# Patient Record
Sex: FEMALE | Race: WHITE | NOT HISPANIC OR LATINO | Employment: FULL TIME | URBAN - METROPOLITAN AREA
[De-identification: names, ages, dates, MRNs, and addresses within clinical notes are randomized per-mention and may not be internally consistent; named-entity substitution may affect disease eponyms.]

---

## 2023-06-01 ENCOUNTER — PATIENT MESSAGE (OUTPATIENT)
Dept: PRIMARY CARE | Facility: CLINIC | Age: 53
End: 2023-06-01
Payer: COMMERCIAL

## 2023-06-01 DIAGNOSIS — E03.9 HYPOTHYROIDISM, UNSPECIFIED TYPE: ICD-10-CM

## 2023-06-06 PROBLEM — E03.9 HYPOTHYROIDISM: Status: ACTIVE | Noted: 2023-06-06

## 2023-06-06 RX ORDER — LEVOTHYROXINE SODIUM 88 UG/1
88 TABLET ORAL DAILY
Qty: 90 TABLET | Refills: 0 | Status: SHIPPED | OUTPATIENT
Start: 2023-06-06 | End: 2023-09-26 | Stop reason: SDUPTHER

## 2023-06-06 RX ORDER — LEVOTHYROXINE SODIUM 88 UG/1
88 TABLET ORAL DAILY
COMMUNITY
Start: 2022-07-18 | End: 2023-06-06 | Stop reason: SDUPTHER

## 2023-06-22 PROBLEM — R73.01 IFG (IMPAIRED FASTING GLUCOSE): Status: ACTIVE | Noted: 2023-06-22

## 2023-06-22 PROBLEM — K21.9 GERD (GASTROESOPHAGEAL REFLUX DISEASE): Status: ACTIVE | Noted: 2023-06-22

## 2023-06-22 PROBLEM — H93.13 TINNITUS OF BOTH EARS: Status: ACTIVE | Noted: 2023-06-22

## 2023-06-22 PROBLEM — R93.1 ABNORMAL ECHOCARDIOGRAM: Status: ACTIVE | Noted: 2023-06-22

## 2023-06-22 PROBLEM — M25.512 LEFT SHOULDER PAIN: Status: ACTIVE | Noted: 2023-06-22

## 2023-06-22 PROBLEM — Z86.16 HISTORY OF COVID-19: Status: ACTIVE | Noted: 2023-06-22

## 2023-06-22 PROBLEM — R73.02 IMPAIRED GLUCOSE TOLERANCE: Status: ACTIVE | Noted: 2023-06-22

## 2023-06-22 PROBLEM — R35.0 URINARY FREQUENCY: Status: ACTIVE | Noted: 2023-06-22

## 2023-06-22 PROBLEM — N92.0 MENORRHAGIA WITH REGULAR CYCLE: Status: ACTIVE | Noted: 2023-06-22

## 2023-06-22 PROBLEM — E78.5 HYPERLIPIDEMIA: Status: ACTIVE | Noted: 2023-06-22

## 2023-06-22 PROBLEM — R94.31 RIGHT AXIS DEVIATION: Status: ACTIVE | Noted: 2023-06-22

## 2023-06-22 PROBLEM — I10 HYPERTENSION: Status: ACTIVE | Noted: 2023-06-22

## 2023-06-22 PROBLEM — H10.9 BACTERIAL CONJUNCTIVITIS: Status: ACTIVE | Noted: 2023-06-22

## 2023-06-22 PROBLEM — R51.9 UNILATERAL HEADACHE: Status: ACTIVE | Noted: 2023-06-22

## 2023-06-22 PROBLEM — E55.9 VITAMIN D INSUFFICIENCY: Status: ACTIVE | Noted: 2023-06-22

## 2023-06-22 PROBLEM — D50.9 IRON DEFICIENCY ANEMIA: Status: ACTIVE | Noted: 2023-06-22

## 2023-06-22 RX ORDER — METOPROLOL SUCCINATE 50 MG/1
1 TABLET, EXTENDED RELEASE ORAL DAILY
COMMUNITY
Start: 2013-09-25 | End: 2023-07-25

## 2023-06-22 RX ORDER — FERROUS SULFATE 325(65) MG
1 TABLET ORAL 2 TIMES DAILY
COMMUNITY
Start: 2017-09-06 | End: 2024-01-24 | Stop reason: ALTCHOICE

## 2023-06-22 RX ORDER — MULTIVITAMIN
1 TABLET ORAL DAILY
COMMUNITY
Start: 2022-07-15

## 2023-06-22 RX ORDER — METFORMIN HYDROCHLORIDE 500 MG/1
2 TABLET, EXTENDED RELEASE ORAL
COMMUNITY
Start: 2015-10-01 | End: 2023-07-25

## 2023-06-22 RX ORDER — CHOLECALCIFEROL (VITAMIN D3) 25 MCG
1 TABLET ORAL DAILY
COMMUNITY
Start: 2022-07-15

## 2023-06-22 RX ORDER — TRIAMCINOLONE ACETONIDE 1 MG/G
CREAM TOPICAL
COMMUNITY
Start: 2021-05-03

## 2023-07-24 ENCOUNTER — OFFICE VISIT (OUTPATIENT)
Dept: PRIMARY CARE | Facility: CLINIC | Age: 53
End: 2023-07-24
Payer: COMMERCIAL

## 2023-07-24 VITALS
BODY MASS INDEX: 25.37 KG/M2 | DIASTOLIC BLOOD PRESSURE: 68 MMHG | TEMPERATURE: 98.1 F | WEIGHT: 162 LBS | SYSTOLIC BLOOD PRESSURE: 116 MMHG

## 2023-07-24 DIAGNOSIS — M25.562 PAIN IN BOTH KNEES, UNSPECIFIED CHRONICITY: ICD-10-CM

## 2023-07-24 DIAGNOSIS — E78.5 HYPERLIPIDEMIA, UNSPECIFIED HYPERLIPIDEMIA TYPE: ICD-10-CM

## 2023-07-24 DIAGNOSIS — I10 HYPERTENSION, UNSPECIFIED TYPE: Primary | ICD-10-CM

## 2023-07-24 DIAGNOSIS — E03.9 HYPOTHYROIDISM, UNSPECIFIED TYPE: ICD-10-CM

## 2023-07-24 DIAGNOSIS — M25.551 BILATERAL HIP PAIN: ICD-10-CM

## 2023-07-24 DIAGNOSIS — E55.9 VITAMIN D INSUFFICIENCY: ICD-10-CM

## 2023-07-24 DIAGNOSIS — R73.01 IFG (IMPAIRED FASTING GLUCOSE): ICD-10-CM

## 2023-07-24 DIAGNOSIS — M25.561 PAIN IN BOTH KNEES, UNSPECIFIED CHRONICITY: ICD-10-CM

## 2023-07-24 DIAGNOSIS — M25.552 BILATERAL HIP PAIN: ICD-10-CM

## 2023-07-24 LAB
ALANINE AMINOTRANSFERASE (SGPT) (U/L) IN SER/PLAS: 11 U/L (ref 7–45)
ALBUMIN (G/DL) IN SER/PLAS: 4.3 G/DL (ref 3.4–5)
ALKALINE PHOSPHATASE (U/L) IN SER/PLAS: 51 U/L (ref 33–110)
ANION GAP IN SER/PLAS: 14 MMOL/L (ref 10–20)
ASPARTATE AMINOTRANSFERASE (SGOT) (U/L) IN SER/PLAS: 11 U/L (ref 9–39)
BILIRUBIN TOTAL (MG/DL) IN SER/PLAS: 0.4 MG/DL (ref 0–1.2)
CALCIDIOL (25 OH VITAMIN D3) (NG/ML) IN SER/PLAS: 57 NG/ML
CALCIUM (MG/DL) IN SER/PLAS: 9.7 MG/DL (ref 8.6–10.3)
CARBON DIOXIDE, TOTAL (MMOL/L) IN SER/PLAS: 28 MMOL/L (ref 21–32)
CHLORIDE (MMOL/L) IN SER/PLAS: 102 MMOL/L (ref 98–107)
CREATININE (MG/DL) IN SER/PLAS: 0.86 MG/DL (ref 0.5–1.05)
GFR FEMALE: 80 ML/MIN/1.73M2
GLUCOSE (MG/DL) IN SER/PLAS: 115 MG/DL (ref 74–99)
POTASSIUM (MMOL/L) IN SER/PLAS: 4.6 MMOL/L (ref 3.5–5.3)
PROTEIN TOTAL: 7.1 G/DL (ref 6.4–8.2)
SODIUM (MMOL/L) IN SER/PLAS: 139 MMOL/L (ref 136–145)
THYROTROPIN (MIU/L) IN SER/PLAS BY DETECTION LIMIT <= 0.05 MIU/L: 0.59 MIU/L (ref 0.44–3.98)
UREA NITROGEN (MG/DL) IN SER/PLAS: 15 MG/DL (ref 6–23)

## 2023-07-24 PROCEDURE — 3074F SYST BP LT 130 MM HG: CPT | Performed by: FAMILY MEDICINE

## 2023-07-24 PROCEDURE — 99214 OFFICE O/P EST MOD 30 MIN: CPT | Performed by: FAMILY MEDICINE

## 2023-07-24 PROCEDURE — 80053 COMPREHEN METABOLIC PANEL: CPT

## 2023-07-24 PROCEDURE — 84443 ASSAY THYROID STIM HORMONE: CPT

## 2023-07-24 PROCEDURE — 82306 VITAMIN D 25 HYDROXY: CPT

## 2023-07-24 PROCEDURE — 83036 HEMOGLOBIN GLYCOSYLATED A1C: CPT

## 2023-07-24 PROCEDURE — 1036F TOBACCO NON-USER: CPT | Performed by: FAMILY MEDICINE

## 2023-07-24 PROCEDURE — 3078F DIAST BP <80 MM HG: CPT | Performed by: FAMILY MEDICINE

## 2023-07-24 NOTE — PROGRESS NOTES
Subjective   Patient ID: [id number]     Autumn Chapa is a 53 y.o. female who presents for Med Management.    HPI  Taking meds as directed without tolerability or affordability issues;   Having hip and knee pain when sleeping only    Review of Systems  CARDIO- No chest pain or pressure, nausea, diaphoresis, paresthesias, dizziness, or syncope with or without exertion;  can do two flights of stairs without difficulty  GI-No blood in stool, tarry stools, pain, vomiting, heartburn, constipation or diarrhea  PULM-No wheezing, coughing or shortness of breath  UROL-No frequency, urgency, blood in urine, or incontinence  ENDO- No change in hair, voice, skin, weight or temperature tolerance   MSK-No locking, giving way/swelling of joints but above concerns of bilateral hip and knee pain that is not helped by Tylenol  NEURO- No daily headaches, hx of concussion, falls in the last year or seizure  DERM-No rashes, blanching or change in any moles    Objective     /68 (BP Location: Left arm, Patient Position: Sitting)   Temp 36.7 °C (98.1 °F) (Oral)   Wt 73.5 kg (162 lb)   BMI 25.37 kg/m²      Physical Exam  Neck-supple without lymphadenopathy or thyromegaly; no carotid bruits  Throat- without erythema or exudate, uvula in midlineNeck-supple without lymphadenopathy or thyromegaly; no carotid bruits  Heart- regular rate and rhythm, normal s1 and s2 without murmur or gallop  Lungs-clear to auscultation  Abdomen-soft, positive bowel sounds, without masses, HSmegaly or pain   Bilateral Knee exam- Full range of motion;  no varus or valgus deviation; negative anterior and posterior drawer sign;  negative Lachman's and Maxime's signs;  negative patellar trap test  Bilateral Hip Exam- no internal or external rotational pain;  full flexion and extension, abduction and adduction;  Positive LASHAWN's sign on right      Assessment/Plan     Problem List Items Addressed This Visit       Hypothyroidism    Relevant Orders    Thyroid  Stimulating Hormone    Hyperlipidemia    Hypertension - Primary    IFG (impaired fasting glucose)    Relevant Orders    Comprehensive Metabolic Panel    Hemoglobin A1C    Vitamin D insufficiency    Relevant Orders    Vitamin D, Total     Other Visit Diagnoses       Bilateral hip pain        Relevant Orders    XR hips bilateral 2 VW w or wo pelvis    XR knee 1-2 views bilateral    Pain in both knees, unspecified chronicity        Relevant Orders    XR hips bilateral 2 VW w or wo pelvis    XR knee 1-2 views bilateral          Follow up fasting (no alcohol for 48 hours and just water for 14 hours) in six months for your next routine appointment.  In general, take any medications on schedule (except for types of Insulin).    Mani Cm MD

## 2023-07-25 LAB
ESTIMATED AVERAGE GLUCOSE FOR HBA1C: 114 MG/DL
HEMOGLOBIN A1C/HEMOGLOBIN TOTAL IN BLOOD: 5.6 %

## 2023-08-17 ENCOUNTER — TELEPHONE (OUTPATIENT)
Dept: PRIMARY CARE | Facility: CLINIC | Age: 53
End: 2023-08-17
Payer: COMMERCIAL

## 2023-08-17 DIAGNOSIS — R73.01 IFG (IMPAIRED FASTING GLUCOSE): ICD-10-CM

## 2023-08-17 DIAGNOSIS — I10 HYPERTENSION, UNSPECIFIED TYPE: ICD-10-CM

## 2023-08-17 NOTE — TELEPHONE ENCOUNTER
Pt requesting refill  Metoprolol Succinate XL 50mg daily  Metformin XR 500mg 2 tabs daily  [facility] order  Last refill 7/25/23 for both medications- pharmacy did not receive refills  Last appt 7/24/23  Next appt 1/23/24

## 2023-08-18 RX ORDER — METFORMIN HYDROCHLORIDE 500 MG/1
TABLET, EXTENDED RELEASE ORAL
Qty: 180 TABLET | Refills: 0 | Status: SHIPPED | OUTPATIENT
Start: 2023-08-18 | End: 2024-01-24 | Stop reason: SDUPTHER

## 2023-08-18 RX ORDER — METOPROLOL SUCCINATE 50 MG/1
50 TABLET, EXTENDED RELEASE ORAL DAILY
Qty: 90 TABLET | Refills: 0 | Status: SHIPPED | OUTPATIENT
Start: 2023-08-18 | End: 2024-01-24 | Stop reason: SDUPTHER

## 2023-11-09 ENCOUNTER — TELEPHONE (OUTPATIENT)
Dept: PRIMARY CARE | Facility: CLINIC | Age: 53
End: 2023-11-09

## 2023-11-09 ENCOUNTER — EVALUATION (OUTPATIENT)
Dept: PHYSICAL THERAPY | Facility: CLINIC | Age: 53
End: 2023-11-09
Payer: COMMERCIAL

## 2023-11-09 DIAGNOSIS — M25.552 BILATERAL HIP PAIN: ICD-10-CM

## 2023-11-09 DIAGNOSIS — M25.551 BILATERAL HIP PAIN: ICD-10-CM

## 2023-11-09 DIAGNOSIS — G89.29 CHRONIC PAIN OF BOTH KNEES: Primary | ICD-10-CM

## 2023-11-09 DIAGNOSIS — G89.29 CHRONIC PAIN OF BOTH KNEES: ICD-10-CM

## 2023-11-09 DIAGNOSIS — M25.552 BILATERAL HIP PAIN: Primary | ICD-10-CM

## 2023-11-09 DIAGNOSIS — M25.561 CHRONIC PAIN OF BOTH KNEES: Primary | ICD-10-CM

## 2023-11-09 DIAGNOSIS — M25.562 CHRONIC PAIN OF BOTH KNEES: ICD-10-CM

## 2023-11-09 DIAGNOSIS — M25.561 CHRONIC PAIN OF BOTH KNEES: ICD-10-CM

## 2023-11-09 DIAGNOSIS — M25.551 BILATERAL HIP PAIN: Primary | ICD-10-CM

## 2023-11-09 DIAGNOSIS — M25.562 CHRONIC PAIN OF BOTH KNEES: Primary | ICD-10-CM

## 2023-11-09 PROCEDURE — 97161 PT EVAL LOW COMPLEX 20 MIN: CPT | Mod: GP | Performed by: PHYSICAL THERAPIST

## 2023-11-09 PROCEDURE — 97110 THERAPEUTIC EXERCISES: CPT | Mod: GP | Performed by: PHYSICAL THERAPIST

## 2023-11-09 ASSESSMENT — PAIN - FUNCTIONAL ASSESSMENT: PAIN_FUNCTIONAL_ASSESSMENT: 0-10

## 2023-11-09 ASSESSMENT — ENCOUNTER SYMPTOMS
OCCASIONAL FEELINGS OF UNSTEADINESS: 0
DEPRESSION: 0
LOSS OF SENSATION IN FEET: 0

## 2023-11-09 ASSESSMENT — PAIN SCALES - GENERAL: PAINLEVEL_OUTOF10: 2

## 2023-11-09 NOTE — LETTER
November 9, 2023     Patient: Autumn Chapa   YOB: 1970   Date of Visit: 11/9/2023       To Whom it May Concern:    Autumn Chapa was seen in my clinic on 11/9/2023. She {Return to school/sport:15756}.    If you have any questions or concerns, please don't hesitate to call.         Sincerely,          Ashley Pinon, PT        CC: No Recipients

## 2023-11-09 NOTE — TELEPHONE ENCOUNTER
On 07/26/23 pt was called with her hip and pelvis x-ray results and you recommend physical therapy and pt is there for her appt and the referral is not in the system. Can you please put this order in.

## 2023-11-09 NOTE — PROGRESS NOTES
Physical Therapy Evaluation and Treatment      Patient Name: Autumn Chapa  MRN: [id number]  Today's Date: 11/9/2023  Time Calculation  Start Time: 1420  Stop Time: 1500  Time Calculation (min): 40 min      Insurance:  Visit:  1 of 40  [facility] required: No  Provider: Mary Ann    Assessment:  PT Assessment Results: Decreased strength, Decreased range of motion, Pain  Rehab Prognosis: Good  Evaluation/Treatment Tolerance: Patient tolerated treatment well  Patient presents to physical therapy with c/o LBP, B hip pain, and B knee pain.  States she has had back pain for many years but most recently started experiencing B hip and knee pain starting about 6mos ago without specific MONA.  Reports worst hip/knee sx when trying to sleep.  Denies N/T.  Exhibits decreased hip and core strength, hypermobility throughout Lx and B hips, and TTP throughout glutes and paraspinals.  S/s consistent with back, hip, and knee pain secondary to generalized core weakness and deconditioning.    Plan:  Treatment/Interventions: Cryotherapy, Dry needling, Education/ Instruction, Electrical stimulation, Gait training, Hot pack, Manual therapy, Neuromuscular re-education, Self care/ home management, Therapeutic activities, Therapeutic exercises  PT Plan: Skilled PT  PT Frequency: 1 time per week  Duration: 12wks    Current Problem:   1. Bilateral hip pain  Follow Up In Physical Therapy      2. Chronic pain of both knees  Follow Up In Physical Therapy          Subjective    Patient presents to physical therapy for evaluation of Lx and hips.  States she has had back pain for many years but most recently started experiencing B hip and knee pain starting about 6mos ago without specific MONA.  Reports worst hip/knee sx when trying to sleep.  Denies N/T.  C/o LBP, B hip pain, and B knee pain.    PREVIOUS INTERVENTION:  PT for her back when she originally started back pain 20+yrs ago    EXACERBATING FACTORS:  Sx worse at night for hips and knees  Increased sx  with stairs (up>down)  Prolonged standing  Prolonged walking  Lifting/carrying    RELIEVING FACTORS:  Cobra stretch for back    OCCUPATION:  Retired    HOBBIES:  Walking dog; Pilates    HOME SETUP:  Multi-story    BARRIERS IMPACTING CARE:  N/A     General:  General  Reason for Referral: Hip/knee pain  Referred By: Dr. Cm  Precautions:  Precautions  STEADI Fall Risk Score (The score of 4 or more indicates an increased risk of falling): 0  Medical Precautions: No known precautions/limitation (Medical hx reviewed.)  Pain:  Pain Assessment  Pain Assessment: 0-10  Pain Score: 2  Pain Location: Back (B hips/knees at night up to 10/10)  Prior Level of Function:  Prior Function Per Pt/Caregiver Report  Level of [place]: Independent with ADLs and functional transfers    Objective   AROM  Lx WNL  B hips increased mobility with PROM ER/IR  B knees WNL    STRENGTH  R hip flexion 5/5  R hip extension 3/5  R hip ABD 4-/5  R hip ER 3+/5  R hip IR 3+/5    L hip flexion 5/5  L hip extension 3/5  L hip ABD 4-/5  L hip ER 3+/5  L hip IR 3+/5    TA activation: weak palpation    PALPATION  TTP R patellar tendon  TTP L glute med  TTP R ITB, piriformis  TTP L T12-L2 UPA, R/L Lx paraspinals    Outcome Measures:  Other Measures  Lower Extremity Funtional Score (LEFS): 55/80     TREATMENT  THERAPEUTIC EXERCISE:  Access Code: [email or web address]  URL: [email or web address]  Date: 11/09/2023  Prepared by: Ashley Pinon    Exercises  - Supine Figure 4 Piriformis Stretch  - 2 x daily - 7 x weekly - 1 sets - 3 reps - 20 hold  - Supine Lower Trunk Rotation  - 2 x daily - 7 x weekly - 1 sets - 10 reps  - Supine Transversus Abdominis Bracing - Hands on Stomach  - 2 x daily - 7 x weekly - 1 sets - 10 reps - 5 hold  - Supine March  - 2 x daily - 7 x weekly - 1 sets - 10 reps  - Supine Bridge  - 2 x daily - 7 x weekly - 1 sets - 10 reps  - Clamshell  - 2 x daily - 7 x weekly - 1 sets - 10 reps    OP EDUCATION:   Patient education on  diagnosis/prognosis, pathophysiology, POC, and HEP.  Patient demonstrates understanding of HEP/POC.    Goals:  1. Pain 0/10  2. Outcomes Measure:  LEFS 65/80  3. BLE strength 5/5 to allow patient to navigate stairs without increased sx.  4. Independent activation of TA with all functional mobility to allow the patient to perform transfers and bed mobility without increased sx.  5. Demonstrates independence with HEP.

## 2023-11-09 NOTE — LETTER
November 9, 2023     Patient: Autumn Chapa   YOB: 1970   Date of Visit: 11/9/2023       To Whom It May Concern:    It is my medical opinion that Autumn Chpaa {Work release (duty restriction):74014}.    If you have any questions or concerns, please don't hesitate to call.         Sincerely,        Ashley Pinon, PT    CC: No Recipients

## 2023-11-16 ENCOUNTER — APPOINTMENT (OUTPATIENT)
Dept: PHYSICAL THERAPY | Facility: CLINIC | Age: 53
End: 2023-11-16
Payer: COMMERCIAL

## 2023-11-30 ENCOUNTER — TREATMENT (OUTPATIENT)
Dept: PHYSICAL THERAPY | Facility: CLINIC | Age: 53
End: 2023-11-30
Payer: COMMERCIAL

## 2023-11-30 DIAGNOSIS — M25.551 BILATERAL HIP PAIN: Primary | ICD-10-CM

## 2023-11-30 DIAGNOSIS — G89.29 CHRONIC PAIN OF BOTH KNEES: ICD-10-CM

## 2023-11-30 DIAGNOSIS — M25.561 CHRONIC PAIN OF BOTH KNEES: ICD-10-CM

## 2023-11-30 DIAGNOSIS — M25.552 BILATERAL HIP PAIN: Primary | ICD-10-CM

## 2023-11-30 DIAGNOSIS — M25.562 CHRONIC PAIN OF BOTH KNEES: ICD-10-CM

## 2023-11-30 PROCEDURE — 97110 THERAPEUTIC EXERCISES: CPT | Mod: GP | Performed by: PHYSICAL THERAPIST

## 2023-11-30 ASSESSMENT — PAIN - FUNCTIONAL ASSESSMENT: PAIN_FUNCTIONAL_ASSESSMENT: 0-10

## 2023-11-30 ASSESSMENT — PAIN SCALES - GENERAL: PAINLEVEL_OUTOF10: 0 - NO PAIN

## 2023-11-30 NOTE — PROGRESS NOTES
"Physical Therapy Treatment    Patient Name: Autumn Chapa  MRN: [id number]  Today's Date: 11/30/2023  Time Calculation  Start Time: 1416  Stop Time: 1500  Time Calculation (min): 44 min    Insurance:  Visit:  2 of 40  Auth required: No  Payor: RAGHAV / Plan: [employer] HEALTH PLAN / Product Type: *No Product type* /     Assessment:   Reports fatigue with hip stabilization exercises.  Able to progress strengthening and HEP without increased pain.    Plan:   Continue to progress core stabilization.    Current Problem  1. Bilateral hip pain        2. Chronic pain of both knees            General  General  Reason for Referral: Hip/knee pain  Referred By: Dr. Cm    Subjective    Hips and knees have been feeling a lot better.  Sleeping much better.  Still has some discomfort with climbing stairs and if she lifts something poorly.    Precautions  Precautions  Medical Precautions: No known precautions/limitation (Medical hx reviewed.)  Pain  Pain Assessment  Pain Assessment: 0-10  Pain Score: 0 - No pain  Pain Location: Hip    Objective   TTP R/L glute med    TREATMENT  THERAPEUTIC EXERCISE:  Recumbent bike seat 8 random level 2 for 6mins  Fig 4 stretch  TA review   Bridges 10x  SL ER 10x ea  SL ABD 10x ea  SLS with 3-way tap 8x ea  Fwd step up training 6\" 10x ea    MANUAL THERAPY:      NEUROMUSCULAR RE-EDUCATION:      THERAPEUTIC ACTIVITY:      MODALITIES:      OP EDUCATION:   Access Code: [email or web address]  URL: [email or web address]  Date: 11/30/2023  Prepared by: Ashley Pinon    Exercises  - Supine Figure 4 Piriformis Stretch  - 2 x daily - 7 x weekly - 1 sets - 3 reps - 20 hold  - Supine Lower Trunk Rotation  - 2 x daily - 7 x weekly - 1 sets - 10 reps  - Supine Transversus Abdominis Bracing - Hands on Stomach  - 2 x daily - 7 x weekly - 1 sets - 10 reps - 5 hold  - Supine March  - 2 x daily - 7 x weekly - 1 sets - 10 reps  - Supine Bridge  - 2 x daily - 7 x weekly - 1 sets - 10 reps  - Clamshell  - 2 x " daily - 7 x weekly - 1 sets - 10 reps  - Sidelying Hip Abduction  - 2 x daily - 7 x weekly - 1 sets - 10 reps  - Single Leg Balance with Clock Reach  - 2 x daily - 7 x weekly - 1 sets - 10 reps  - Step Up  - 2 x daily - 7 x weekly - 1 sets - 10 reps    Goals:  1. Pain 0/10  2. Outcomes Measure:  LEFS 65/80  3. BLE strength 5/5 to allow patient to navigate stairs without increased sx.  4. Independent activation of TA with all functional mobility to allow the patient to perform transfers and bed mobility without increased sx.  5. Demonstrates independence with HEP.

## 2023-12-07 ENCOUNTER — TREATMENT (OUTPATIENT)
Dept: PHYSICAL THERAPY | Facility: CLINIC | Age: 53
End: 2023-12-07
Payer: COMMERCIAL

## 2023-12-07 DIAGNOSIS — M25.551 BILATERAL HIP PAIN: Primary | ICD-10-CM

## 2023-12-07 DIAGNOSIS — M25.552 BILATERAL HIP PAIN: Primary | ICD-10-CM

## 2023-12-07 PROCEDURE — 97140 MANUAL THERAPY 1/> REGIONS: CPT | Mod: GP | Performed by: PHYSICAL THERAPIST

## 2023-12-07 PROCEDURE — 97110 THERAPEUTIC EXERCISES: CPT | Mod: GP | Performed by: PHYSICAL THERAPIST

## 2023-12-07 ASSESSMENT — PAIN SCALES - GENERAL: PAINLEVEL_OUTOF10: 0 - NO PAIN

## 2023-12-07 ASSESSMENT — PAIN - FUNCTIONAL ASSESSMENT: PAIN_FUNCTIONAL_ASSESSMENT: 0-10

## 2023-12-08 DIAGNOSIS — E03.9 HYPOTHYROIDISM, UNSPECIFIED TYPE: ICD-10-CM

## 2023-12-11 RX ORDER — LEVOTHYROXINE SODIUM 88 UG/1
88 TABLET ORAL DAILY
Qty: 90 TABLET | Refills: 0 | OUTPATIENT
Start: 2023-12-11

## 2023-12-14 ENCOUNTER — TREATMENT (OUTPATIENT)
Dept: PHYSICAL THERAPY | Facility: CLINIC | Age: 53
End: 2023-12-14
Payer: COMMERCIAL

## 2023-12-14 DIAGNOSIS — G89.29 CHRONIC PAIN OF BOTH KNEES: ICD-10-CM

## 2023-12-14 DIAGNOSIS — M25.562 CHRONIC PAIN OF BOTH KNEES: ICD-10-CM

## 2023-12-14 DIAGNOSIS — M25.561 CHRONIC PAIN OF BOTH KNEES: ICD-10-CM

## 2023-12-14 DIAGNOSIS — M25.551 BILATERAL HIP PAIN: Primary | ICD-10-CM

## 2023-12-14 DIAGNOSIS — M25.552 BILATERAL HIP PAIN: Primary | ICD-10-CM

## 2023-12-14 PROCEDURE — 97110 THERAPEUTIC EXERCISES: CPT | Mod: GP | Performed by: PHYSICAL THERAPIST

## 2023-12-14 ASSESSMENT — PAIN - FUNCTIONAL ASSESSMENT: PAIN_FUNCTIONAL_ASSESSMENT: 0-10

## 2023-12-14 ASSESSMENT — PAIN SCALES - GENERAL: PAINLEVEL_OUTOF10: 0 - NO PAIN

## 2023-12-14 NOTE — PROGRESS NOTES
"Physical Therapy Treatment    Patient Name: Autumn Chapa  MRN: [id number]  Today's Date: 12/14/2023  Time Calculation  Start Time: 1505  Stop Time: 1549  Time Calculation (min): 44 min    Insurance:  Visit:  4 of 40  Auth required: No  Payor: EVELINARAFIQ / Plan: CIGNA HEALTH PLAN / Product Type: *No Product type* /     Assessment:   Able to progress strengthening without increased sx.    Plan:   Progress to HEP.    Current Problem  1. Bilateral hip pain        2. Chronic pain of both knees            General  General  Reason for Referral: Hip/knee pain  Referred By: Dr. Cm    Subjective    No new complaints.  Continue to have improved sleep and no pain.    Precautions  Precautions  Medical Precautions: No known precautions/limitation (Medical hx reviewed.)  Pain  Pain Assessment  Pain Assessment: 0-10  Pain Score: 0 - No pain  Pain Location: Hip    Objective   TTP R/L piriformis    TREATMENT  THERAPEUTIC EXERCISE:  Recumbent bike seat 8 random level 2 for 6mins  Fig 4 stretch 20\"x2 ea  Bridges 10x  TG L5 + 20# SDR 4 B press 10x  TG L3 + 20# SDR 5 SL press 10x2 ea  Fwd step and hold on foam 10x ea  Lateral step and hold on foam 10x ea  SLS with 3-way tap 8x ea    MANUAL THERAPY:      NEUROMUSCULAR RE-EDUCATION:      THERAPEUTIC ACTIVITY:      MODALITIES:      OP EDUCATION:   Access Code: [email or web address]  URL: [email or web address]  Date: 12/07/2023  Prepared by: Ashley Pinon    Exercises  - Supine Figure 4 Piriformis Stretch  - 2 x daily - 7 x weekly - 1 sets - 3 reps - 20 hold  - Supine Lower Trunk Rotation  - 2 x daily - 7 x weekly - 1 sets - 10 reps  - Supine Transversus Abdominis Bracing - Hands on Stomach  - 2 x daily - 7 x weekly - 1 sets - 10 reps - 5 hold  - Supine March  - 2 x daily - 7 x weekly - 1 sets - 10 reps  - Supine Bridge  - 2 x daily - 7 x weekly - 1 sets - 10 reps  - Clamshell  - 2 x daily - 7 x weekly - 1 sets - 10 reps  - Sidelying Hip Abduction  - 2 x daily - 7 x weekly - 1 sets " - 10 reps  - Single Leg Balance with Clock Reach  - 2 x daily - 7 x weekly - 1 sets - 10 reps  - Step Up  - 2 x daily - 7 x weekly - 1 sets - 10 reps  - Side Stepping with Resistance at Feet  - 2 x daily - 7 x weekly - 1 sets - 10 reps    Goals:  1. Pain 0/10  2. Outcomes Measure:  LEFS 65/80  3. BLE strength 5/5 to allow patient to navigate stairs without increased sx.  4. Independent activation of TA with all functional mobility to allow the patient to perform transfers and bed mobility without increased sx.  5. Demonstrates independence with HEP.

## 2023-12-21 ENCOUNTER — APPOINTMENT (OUTPATIENT)
Dept: PHYSICAL THERAPY | Facility: CLINIC | Age: 53
End: 2023-12-21
Payer: COMMERCIAL

## 2023-12-28 ENCOUNTER — TELEPHONE (OUTPATIENT)
Dept: PRIMARY CARE | Facility: CLINIC | Age: 53
End: 2023-12-28
Payer: COMMERCIAL

## 2023-12-28 DIAGNOSIS — E03.9 HYPOTHYROIDISM, UNSPECIFIED TYPE: ICD-10-CM

## 2023-12-28 RX ORDER — LEVOTHYROXINE SODIUM 88 UG/1
88 TABLET ORAL DAILY
Qty: 90 TABLET | Refills: 1 | Status: SHIPPED | OUTPATIENT
Start: 2023-12-28 | End: 2024-06-25

## 2023-12-28 NOTE — TELEPHONE ENCOUNTER
Refill:    Levothyroxine 88mcg daily    Pharm: [facility]    LR: 09/26/23 qty 90 no refills  LV: 07/24/23   NV: 01/23/24

## 2024-01-04 ENCOUNTER — APPOINTMENT (OUTPATIENT)
Dept: PHYSICAL THERAPY | Facility: CLINIC | Age: 54
End: 2024-01-04
Payer: COMMERCIAL

## 2024-01-21 DIAGNOSIS — I10 HYPERTENSION, UNSPECIFIED TYPE: ICD-10-CM

## 2024-01-22 PROBLEM — Z86.16 HISTORY OF COVID-19: Status: RESOLVED | Noted: 2023-06-22 | Resolved: 2024-01-22

## 2024-01-22 PROBLEM — H10.9 BACTERIAL CONJUNCTIVITIS: Status: RESOLVED | Noted: 2023-06-22 | Resolved: 2024-01-22

## 2024-01-22 PROBLEM — Z23 ENCOUNTER FOR IMMUNIZATION: Status: ACTIVE | Noted: 2024-01-22

## 2024-01-22 RX ORDER — METOPROLOL SUCCINATE 50 MG/1
50 TABLET, EXTENDED RELEASE ORAL DAILY
Qty: 90 TABLET | Refills: 0 | OUTPATIENT
Start: 2024-01-22

## 2024-01-24 ENCOUNTER — OFFICE VISIT (OUTPATIENT)
Dept: PRIMARY CARE | Facility: CLINIC | Age: 54
End: 2024-01-24
Payer: COMMERCIAL

## 2024-01-24 VITALS
TEMPERATURE: 98.2 F | WEIGHT: 164 LBS | BODY MASS INDEX: 25.69 KG/M2 | DIASTOLIC BLOOD PRESSURE: 88 MMHG | SYSTOLIC BLOOD PRESSURE: 139 MMHG

## 2024-01-24 DIAGNOSIS — R73.01 IFG (IMPAIRED FASTING GLUCOSE): Primary | ICD-10-CM

## 2024-01-24 DIAGNOSIS — H93.13 TINNITUS OF BOTH EARS: ICD-10-CM

## 2024-01-24 DIAGNOSIS — E03.9 HYPOTHYROIDISM, UNSPECIFIED TYPE: ICD-10-CM

## 2024-01-24 DIAGNOSIS — G47.00 INSOMNIA, UNSPECIFIED TYPE: ICD-10-CM

## 2024-01-24 DIAGNOSIS — Z23 ENCOUNTER FOR IMMUNIZATION: ICD-10-CM

## 2024-01-24 DIAGNOSIS — I10 HYPERTENSION, UNSPECIFIED TYPE: ICD-10-CM

## 2024-01-24 DIAGNOSIS — E78.00 ELEVATED LOW-DENSITY LIPOPROTEIN LEVEL: Primary | ICD-10-CM

## 2024-01-24 DIAGNOSIS — R73.02 IMPAIRED GLUCOSE TOLERANCE: ICD-10-CM

## 2024-01-24 LAB
POC FINGERSTICK BLOOD GLUCOSE: 109 MG/DL (ref 70–100)
POC HDL CHOLESTEROL: 65 MG/DL (ref 0–50)
POC LDL CHOLESTEROL: 131 MG/DL (ref 0–100)
POC NON-HDL CHOLESTEROL: 167 MG/DL (ref 0–130)
POC TOTAL CHOLESTEROL/HDL RATIO: 3.6 (ref 0–4.5)
POC TOTAL CHOLESTEROL: 232 MG/DL (ref 0–199)
POC TRIGLYCERIDES: 180 MG/DL (ref 0–150)

## 2024-01-24 PROCEDURE — 82962 GLUCOSE BLOOD TEST: CPT | Performed by: FAMILY MEDICINE

## 2024-01-24 PROCEDURE — 99214 OFFICE O/P EST MOD 30 MIN: CPT | Performed by: FAMILY MEDICINE

## 2024-01-24 PROCEDURE — 1036F TOBACCO NON-USER: CPT | Performed by: FAMILY MEDICINE

## 2024-01-24 PROCEDURE — 3075F SYST BP GE 130 - 139MM HG: CPT | Performed by: FAMILY MEDICINE

## 2024-01-24 PROCEDURE — 3079F DIAST BP 80-89 MM HG: CPT | Performed by: FAMILY MEDICINE

## 2024-01-24 PROCEDURE — 80061 LIPID PANEL: CPT | Performed by: FAMILY MEDICINE

## 2024-01-24 RX ORDER — METOPROLOL SUCCINATE 50 MG/1
50 TABLET, EXTENDED RELEASE ORAL DAILY
Qty: 90 TABLET | Refills: 0 | Status: SHIPPED | OUTPATIENT
Start: 2024-01-24

## 2024-01-24 RX ORDER — METFORMIN HYDROCHLORIDE 500 MG/1
TABLET, EXTENDED RELEASE ORAL
Qty: 180 TABLET | Refills: 0 | Status: SHIPPED | OUTPATIENT
Start: 2024-01-24 | End: 2024-03-06 | Stop reason: SDUPTHER

## 2024-01-24 NOTE — PATIENT INSTRUCTIONS
Please check with your insurance to verify whether you should get your shingles vaccination here or at the pharmacy, and whether it is covered.  The purpose of this shot is to prevent the permanent unremitting pain of Post Herpetic Neuralgia which becomes more common in elderly patients that get Shingles.  This shot can be very expensive.     You are eligible for the COVID booster.     Follow up fasting (no alcohol for 48 hours and just water for 14 hours) in six months for your next routine appointment.  In general, take any medications on schedule (except for types of Insulin).     Statement Selected

## 2024-01-24 NOTE — PROGRESS NOTES
"Subjective   Patient ID: [id number]      Autumn Chapa is a 53 y.o. female who presents for No chief complaint on file..     HPI  Taking meds as directed without tolerability or affordability issues; no complaints today.     Review of Systems  GEN-denies, fever, weakness or myalgias; no unexplained fever or chills  OPTH-No dry eyes, itchy eyes, or blurry vision   ENT-No hearing loss, or vertigo;  tinnitus is stable  NECK-no stiffness, swelling or pain  PSYCH-No complaints regarding appetite, memory or concentration;  no drug use or alcohol usage over six per week  SLEEP-No complaints of restless legs;  sleep is interrupted by having to urinate once and has had apneic spells;  feels tired in the morning; has \"involved\" dreams;  frequent awakenings  ALL/IMMUN-No history of sneezing or itching  HEM-No unexplained bruising or bleeding     Objective   There were no vitals taken for this visit.      Physical Exam  Eyes-pupils equal round, reactive to light and accommodation, fundi with normal cup/disc ratio, conjunctiva without redness or discharge  General- well defined, well nourished, well hydrated individual in NAD  Skin- normal color and turgor; without nail pitting  Head-normocephalic without masses or tenderness  Ears-normal pinnae, and canals, with normal landmarks and light reflex of tympanic membranes bilaterally  Nose-septum in the midline, normal mucosa bilaterally  Throat- without erythema or exudate, uvula in midline  Neck-supple without lymphadenopathy or thyromegaly; no carotid bruits  Heart- regular rate and rhythm, normal s1 and s2 without murmur or gallop  Lungs-clear to auscultation  Abdomen-soft, positive bowel sounds, without masses, HSmegaly or pain      Assessment/Plan   Problem List Items Addressed This Visit         Hyperlipidemia     Relevant Orders     POCT Lipid Panel manually resulted     Hypertension     IFG (impaired fasting glucose) - Primary     Relevant Orders     POCT fingerstick " glucose manually resulted     Impaired glucose tolerance     Tinnitus of both ears       Needs hearing screen in 2025           Encounter for immunization      You are eligible for the COVID booster.     Follow up fasting (no alcohol for 48 hours and just water for 14 hours) in six months for your next routine appointment.  In general, take any medications on schedule (except for types of Insulin).       Mani Cm MD

## 2024-02-09 ENCOUNTER — CLINICAL SUPPORT (OUTPATIENT)
Dept: SLEEP MEDICINE | Facility: HOSPITAL | Age: 54
End: 2024-02-09
Payer: COMMERCIAL

## 2024-02-09 DIAGNOSIS — G47.00 INSOMNIA, UNSPECIFIED TYPE: ICD-10-CM

## 2024-02-09 PROCEDURE — 95806 SLEEP STUDY UNATT&RESP EFFT: CPT | Performed by: PSYCHIATRY & NEUROLOGY

## 2024-03-06 DIAGNOSIS — I10 HYPERTENSION, UNSPECIFIED TYPE: ICD-10-CM

## 2024-03-06 DIAGNOSIS — R73.01 IFG (IMPAIRED FASTING GLUCOSE): ICD-10-CM

## 2024-03-06 RX ORDER — METFORMIN HYDROCHLORIDE 500 MG/1
TABLET, EXTENDED RELEASE ORAL
Qty: 180 TABLET | Refills: 0 | Status: SHIPPED | OUTPATIENT
Start: 2024-03-06

## 2024-03-06 RX ORDER — METOPROLOL SUCCINATE 50 MG/1
50 TABLET, EXTENDED RELEASE ORAL DAILY
Qty: 90 TABLET | Refills: 0 | Status: CANCELLED | OUTPATIENT
Start: 2024-03-06

## 2024-03-06 NOTE — TELEPHONE ENCOUNTER
Pt is DR. DEL CID's pt and DR. TIRADO is covering     Refill:    Metformin XR 500mg take 2 tabs with evening meals     Pharm: [facility] # [phone]    LR: 01/24/24 qty 180 no refills  LV: 01/24/24   NV: 07/30/24

## 2024-03-16 DIAGNOSIS — E03.9 HYPOTHYROIDISM, UNSPECIFIED TYPE: ICD-10-CM

## 2024-03-16 RX ORDER — LEVOTHYROXINE SODIUM 88 UG/1
88 TABLET ORAL DAILY
Qty: 90 TABLET | Refills: 0 | OUTPATIENT
Start: 2024-03-16

## 2024-04-09 DIAGNOSIS — I10 HYPERTENSION, UNSPECIFIED TYPE: ICD-10-CM

## 2024-04-09 RX ORDER — METOPROLOL SUCCINATE 50 MG/1
50 TABLET, EXTENDED RELEASE ORAL DAILY
Qty: 90 TABLET | Refills: 0 | OUTPATIENT
Start: 2024-04-09

## 2024-07-09 ENCOUNTER — TELEPHONE (OUTPATIENT)
Dept: PRIMARY CARE | Facility: CLINIC | Age: 54
End: 2024-07-09
Payer: COMMERCIAL

## 2024-07-09 DIAGNOSIS — E03.9 HYPOTHYROIDISM, UNSPECIFIED TYPE: ICD-10-CM

## 2024-07-09 RX ORDER — LEVOTHYROXINE SODIUM 88 UG/1
88 TABLET ORAL DAILY
Qty: 90 TABLET | Refills: 1 | Status: SHIPPED | OUTPATIENT
Start: 2024-07-09 | End: 2025-01-05

## 2024-07-09 NOTE — TELEPHONE ENCOUNTER
REFILL REQUEST    Med: Levothyroxine   Med Dose: 88 mcg  Med Frequency: one tab daily    Pharmacy: [facility]    LR: 12/28/2023  LV: 01/24/2024  NV: 07/30/2024

## 2024-07-22 ENCOUNTER — TELEPHONE (OUTPATIENT)
Dept: PRIMARY CARE | Facility: CLINIC | Age: 54
End: 2024-07-22
Payer: COMMERCIAL

## 2024-07-22 DIAGNOSIS — I10 HYPERTENSION, UNSPECIFIED TYPE: ICD-10-CM

## 2024-07-22 RX ORDER — METOPROLOL SUCCINATE 50 MG/1
50 TABLET, EXTENDED RELEASE ORAL DAILY
Qty: 90 TABLET | Refills: 0 | Status: SHIPPED | OUTPATIENT
Start: 2024-07-22

## 2024-07-22 NOTE — TELEPHONE ENCOUNTER
REFILL REQUEST    Med: Metoprolol Succinate XL  Med Dose: 50 mg  Med Frequency: one tab daily    Pharmacy: [facility]    LR: 01/24/2024  LV: 01/24/2024  NV: 07/30/2024

## 2024-07-27 DIAGNOSIS — R73.01 IFG (IMPAIRED FASTING GLUCOSE): ICD-10-CM

## 2024-07-29 RX ORDER — METFORMIN HYDROCHLORIDE 500 MG/1
TABLET, EXTENDED RELEASE ORAL
Qty: 180 TABLET | Refills: 0 | OUTPATIENT
Start: 2024-07-29

## 2024-07-30 ENCOUNTER — APPOINTMENT (OUTPATIENT)
Dept: PRIMARY CARE | Facility: CLINIC | Age: 54
End: 2024-07-30
Payer: COMMERCIAL

## 2024-08-03 DIAGNOSIS — R73.01 IFG (IMPAIRED FASTING GLUCOSE): ICD-10-CM

## 2024-08-05 RX ORDER — METFORMIN HYDROCHLORIDE 500 MG/1
TABLET, EXTENDED RELEASE ORAL
Qty: 180 TABLET | Refills: 0 | OUTPATIENT
Start: 2024-08-05

## 2024-09-27 DIAGNOSIS — I10 HYPERTENSION, UNSPECIFIED TYPE: ICD-10-CM

## 2024-09-29 RX ORDER — METOPROLOL SUCCINATE 50 MG/1
50 TABLET, EXTENDED RELEASE ORAL DAILY
Qty: 90 TABLET | Refills: 0 | OUTPATIENT
Start: 2024-09-29